# Patient Record
Sex: FEMALE | URBAN - METROPOLITAN AREA
[De-identification: names, ages, dates, MRNs, and addresses within clinical notes are randomized per-mention and may not be internally consistent; named-entity substitution may affect disease eponyms.]

---

## 2022-01-12 ENCOUNTER — HOSPITAL ENCOUNTER (OUTPATIENT)
Dept: TELEMEDICINE | Facility: HOSPITAL | Age: 82
Discharge: HOME | End: 2022-01-12

## 2022-01-12 PROCEDURE — 99499 NO LOS: ICD-10-PCS | Mod: ,,, | Performed by: PSYCHIATRY & NEUROLOGY

## 2022-01-12 PROCEDURE — 99499 UNLISTED E&M SERVICE: CPT | Mod: ,,, | Performed by: PSYCHIATRY & NEUROLOGY

## 2022-01-13 NOTE — CONSULTS
Spoke unable to get cart working for video call. Phone consult performed.      81F LSN 2 pm yesterday, went to OSH w/ left hemineglect and paralysis. In our ED having left sided mild paralysis.  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    Patient already on DAPT and will need vascular surgery consult